# Patient Record
Sex: FEMALE | Race: OTHER | HISPANIC OR LATINO
[De-identification: names, ages, dates, MRNs, and addresses within clinical notes are randomized per-mention and may not be internally consistent; named-entity substitution may affect disease eponyms.]

---

## 2018-05-11 ENCOUNTER — APPOINTMENT (OUTPATIENT)
Dept: VASCULAR SURGERY | Facility: CLINIC | Age: 68
End: 2018-05-11
Payer: MEDICARE

## 2018-05-11 VITALS — DIASTOLIC BLOOD PRESSURE: 80 MMHG | SYSTOLIC BLOOD PRESSURE: 152 MMHG | HEART RATE: 81 BPM | OXYGEN SATURATION: 99 %

## 2018-05-11 DIAGNOSIS — Z86.79 PERSONAL HISTORY OF OTHER DISEASES OF THE CIRCULATORY SYSTEM: ICD-10-CM

## 2018-05-11 PROCEDURE — 99213 OFFICE O/P EST LOW 20 MIN: CPT | Mod: 25

## 2018-05-11 PROCEDURE — 93990 DOPPLER FLOW TESTING: CPT

## 2018-05-16 PROBLEM — Z86.79 HISTORY OF HYPERTENSION: Status: RESOLVED | Noted: 2018-05-16 | Resolved: 2018-05-16

## 2018-11-30 ENCOUNTER — APPOINTMENT (OUTPATIENT)
Dept: VASCULAR SURGERY | Facility: CLINIC | Age: 68
End: 2018-11-30
Payer: MEDICARE

## 2018-11-30 PROCEDURE — 93990 DOPPLER FLOW TESTING: CPT

## 2018-11-30 PROCEDURE — 99213 OFFICE O/P EST LOW 20 MIN: CPT

## 2019-03-01 ENCOUNTER — APPOINTMENT (OUTPATIENT)
Dept: VASCULAR SURGERY | Facility: CLINIC | Age: 69
End: 2019-03-01

## 2019-04-22 ENCOUNTER — APPOINTMENT (OUTPATIENT)
Dept: VASCULAR SURGERY | Facility: CLINIC | Age: 69
End: 2019-04-22
Payer: MEDICARE

## 2019-04-22 VITALS — SYSTOLIC BLOOD PRESSURE: 105 MMHG | HEART RATE: 99 BPM | OXYGEN SATURATION: 96 % | DIASTOLIC BLOOD PRESSURE: 72 MMHG

## 2019-04-22 PROCEDURE — 99213 OFFICE O/P EST LOW 20 MIN: CPT

## 2019-04-22 PROCEDURE — 93990 DOPPLER FLOW TESTING: CPT

## 2019-04-23 NOTE — HISTORY OF PRESENT ILLNESS
[FreeTextEntry1] : 67 yo F, with ESRD, on HD via  L AVF  presents today for follow up on her fistula. Patient is doing well. She denies any pain at AVF site, no swelling. She does report some bleeding at the venous site during diallysis.

## 2019-04-23 NOTE — REVIEW OF SYSTEMS
[Negative] : Endocrine [Fever] : no fever [Chills] : no chills [Leg Claudication] : no intermittent leg claudication [Lower Ext Edema] : no lower extremity edema [Limb Pain] : no limb pain [Limb Swelling] : no limb swelling

## 2019-04-23 NOTE — END OF VISIT
[FreeTextEntry3] : All medical record entries made by the Scribe were at my, Dr. Locke's direction and personally dictated by me on 04/22/2019 . I have reviewed the chart and agree that the record accurately reflects my personal performance of the history, physical exam, assessment and plan. I have also personally directed, reviewed, and agreed with the chart.

## 2019-04-23 NOTE — PHYSICAL EXAM
[Normal Heart Sounds] : normal heart sounds [Normal Breath Sounds] : Normal breath sounds [2+] : left 2+ [Alert] : alert [Calm] : calm [JVD] : no jugular venous distention  [de-identified] : WN/WD, NAD [FreeTextEntry1] : LUE: well healed incision over medial upper arm, good, strong thrill

## 2019-04-23 NOTE — ASSESSMENT
[FreeTextEntry1] : 67 yo F, with ESRD, on HD via  L AVF  presents today for follow up on her fistula. US done in the office today demonstrating good flow. F/u in 3 months or sooner if necessary.

## 2019-07-22 ENCOUNTER — APPOINTMENT (OUTPATIENT)
Dept: VASCULAR SURGERY | Facility: CLINIC | Age: 69
End: 2019-07-22
Payer: MEDICARE

## 2019-07-22 PROCEDURE — 93990 DOPPLER FLOW TESTING: CPT

## 2019-07-22 PROCEDURE — 99213 OFFICE O/P EST LOW 20 MIN: CPT

## 2019-07-22 NOTE — REVIEW OF SYSTEMS
[Negative] : Heme/Lymph [Fever] : no fever [Leg Claudication] : no intermittent leg claudication [Chills] : no chills [Lower Ext Edema] : no lower extremity edema [Limb Pain] : no limb pain [Limb Swelling] : no limb swelling

## 2019-07-22 NOTE — END OF VISIT
[FreeTextEntry3] : All medical record entries made by the Scribe were at my, Dr. Locke's, discretion and personally dictated by me on 07/22/2019 . I have reviewed the chart and agree that the record accurately reflects my personal performance of the history, physical exam, assessment and plan. I have also personally directed, reviewed and agreed to the chart.

## 2019-07-22 NOTE — PHYSICAL EXAM
[Normal Breath Sounds] : Normal breath sounds [Normal Heart Sounds] : normal heart sounds [2+] : left 2+ [Alert] : alert [Calm] : calm [JVD] : no jugular venous distention  [de-identified] : WN/WD, NAD [de-identified] : NCAT [FreeTextEntry1] : LUE: well healed incision over medial upper arm, good, strong thrill

## 2019-07-22 NOTE — HISTORY OF PRESENT ILLNESS
[FreeTextEntry1] : 70 yo F with ESRD, on HD via  L AVF  presents today for follow up on her fistula. Patient is doing well. She denies any pain at AVF site, no swelling.

## 2019-07-22 NOTE — ADDENDUM
[FreeTextEntry1] : This note was written by Nellie Gamez on 07/22/2019  acting as scribe for Dr. Salcedo.\par

## 2019-07-22 NOTE — ASSESSMENT
[FreeTextEntry1] : 68 yo F with ESRD, on HD via L AVF  presents today for follow up on her fistula. US done in the office today demonstrating good flow. F/u in 3 months or sooner if necessary.

## 2019-11-11 ENCOUNTER — APPOINTMENT (OUTPATIENT)
Dept: VASCULAR SURGERY | Facility: CLINIC | Age: 69
End: 2019-11-11

## 2020-01-31 ENCOUNTER — APPOINTMENT (OUTPATIENT)
Dept: VASCULAR SURGERY | Facility: CLINIC | Age: 70
End: 2020-01-31
Payer: MEDICARE

## 2020-01-31 PROCEDURE — 93990 DOPPLER FLOW TESTING: CPT

## 2020-01-31 PROCEDURE — 99213 OFFICE O/P EST LOW 20 MIN: CPT

## 2020-01-31 NOTE — HISTORY OF PRESENT ILLNESS
[FreeTextEntry1] : 69 y/o F with ESRD, on HD via L AVF presents today for follow up on her fistula. Patient is doing well. She denies any pain at AVF site, no swelling. \par F/u in 3 months

## 2020-04-24 ENCOUNTER — APPOINTMENT (OUTPATIENT)
Dept: VASCULAR SURGERY | Facility: CLINIC | Age: 70
End: 2020-04-24

## 2020-07-24 ENCOUNTER — APPOINTMENT (OUTPATIENT)
Dept: VASCULAR SURGERY | Facility: CLINIC | Age: 70
End: 2020-07-24
Payer: MEDICARE

## 2020-07-24 DIAGNOSIS — N18.6 END STAGE RENAL DISEASE: ICD-10-CM

## 2020-07-24 DIAGNOSIS — Z99.2 END STAGE RENAL DISEASE: ICD-10-CM

## 2020-07-24 DIAGNOSIS — T82.590A OTHER MECHANICAL COMPLICATION OF SURGICALLY CREATED ARTERIOVENOUS FISTULA, INITIAL ENCOUNTER: ICD-10-CM

## 2020-07-24 PROCEDURE — 99214 OFFICE O/P EST MOD 30 MIN: CPT

## 2020-07-24 PROCEDURE — 93990 DOPPLER FLOW TESTING: CPT

## 2020-07-24 NOTE — HISTORY OF PRESENT ILLNESS
[FreeTextEntry1] : 69 y/o F with ESRD, on HD via L AVF presents today for follow up on her fistula. Patient states that she developed  prolonged bleeding after her dialysis session and she experiences occasional pains. She is able to complete her dialysis sessions. No other c/os at this time.\par

## 2020-07-24 NOTE — PHYSICAL EXAM
[2+] : left 2+ [No Rash or Lesion] : No rash or lesion [Calm] : calm [de-identified] : pleasant [de-identified] : +FROM throughout [FreeTextEntry1] : LUE: + thrill appreciated, pulses intact [de-identified] : grossly intact

## 2020-07-24 NOTE — ASSESSMENT
[Arterial/Venous Disease] : arterial/venous disease [FreeTextEntry1] : 71 y/o F with ESRD, on HD via L AVF presents today for follow up on her fistula. 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

## 2020-07-24 NOTE — PROCEDURE
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

## 2020-07-27 ENCOUNTER — LABORATORY RESULT (OUTPATIENT)
Age: 70
End: 2020-07-27

## 2020-07-29 ENCOUNTER — TRANSCRIPTION ENCOUNTER (OUTPATIENT)
Age: 70
End: 2020-07-29

## 2020-07-29 VITALS
RESPIRATION RATE: 18 BRPM | DIASTOLIC BLOOD PRESSURE: 78 MMHG | SYSTOLIC BLOOD PRESSURE: 113 MMHG | OXYGEN SATURATION: 100 % | WEIGHT: 118.83 LBS | TEMPERATURE: 98 F | HEART RATE: 67 BPM | HEIGHT: 63 IN

## 2020-07-29 NOTE — ASU PATIENT PROFILE, ADULT - NS PRO AD PATIENT TYPE
Health Care Proxy (HCP)/Hernan Asher-friend Health Care Proxy (HCP)/Hernan Mulligan-[facility]  [phone]

## 2020-07-29 NOTE — ASU PATIENT PROFILE, ADULT - PMH
Gastritis    Hyperlipidemia    Hypothyroidism    Polycystic kidney disease ESRD (end stage renal disease)  HD M-W-F  Gastritis    Hyperlipidemia    Hypothyroidism    Polycystic kidney disease

## 2020-07-30 ENCOUNTER — OUTPATIENT (OUTPATIENT)
Dept: INPATIENT UNIT | Facility: HOSPITAL | Age: 70
LOS: 1 days | Discharge: HOME | End: 2020-07-30
Payer: MEDICARE

## 2020-07-30 ENCOUNTER — APPOINTMENT (OUTPATIENT)
Dept: VASCULAR SURGERY | Facility: HOSPITAL | Age: 70
End: 2020-07-30

## 2020-07-30 VITALS
HEART RATE: 73 BPM | OXYGEN SATURATION: 100 % | RESPIRATION RATE: 31 BRPM | SYSTOLIC BLOOD PRESSURE: 91 MMHG | DIASTOLIC BLOOD PRESSURE: 64 MMHG

## 2020-07-30 DIAGNOSIS — Z90.5 ACQUIRED ABSENCE OF KIDNEY: Chronic | ICD-10-CM

## 2020-07-30 DIAGNOSIS — Z99.2 DEPENDENCE ON RENAL DIALYSIS: Chronic | ICD-10-CM

## 2020-07-30 PROCEDURE — C1894: CPT

## 2020-07-30 PROCEDURE — C1769: CPT

## 2020-07-30 PROCEDURE — 36903 INTRO CATH DIALYSIS CIRCUIT: CPT

## 2020-07-30 PROCEDURE — C1887: CPT

## 2020-07-30 PROCEDURE — 76000 FLUOROSCOPY <1 HR PHYS/QHP: CPT

## 2020-07-30 PROCEDURE — 36902 INTRO CATH DIALYSIS CIRCUIT: CPT | Mod: GC

## 2020-07-30 PROCEDURE — C1725: CPT

## 2020-07-30 RX ORDER — ACETAMINOPHEN 500 MG
650 TABLET ORAL EVERY 4 HOURS
Refills: 0 | Status: DISCONTINUED | OUTPATIENT
Start: 2020-07-30 | End: 2020-07-30

## 2020-07-30 NOTE — BRIEF OPERATIVE NOTE - OPERATION/FINDINGS
Local anesthesia with sedation. Access obtain in LUE fistula with 5F sheath. Balloon angioplasty with mustang 10x40 of the L subclavian vein proximal to the previous stents- Previous stents in place and patent. Upsized to 7F sheath and uses cutting balloon 10x40 for angioplasty of the distal subclavian vein just before the previous stents. &F sheath left in place. Pt in the PACU. Will obtain ACT, remove the sheath safely and pt can be discharged home. Local anesthesia with sedation. Access obtain in LUE fistula with 5F sheath and 4000U of heparin administered. 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 Balloon angioplasty with mustang 10x40 of the L subclavian vein proximal to the previous stents- Previous stents in place and patent. Upsized to 7F sheath and use of a cutting balloon 10x40 for angioplasty of the distal subclavian vein just before the previous stents. &F sheath left in place. Pt in the PACU. Will obtain ACT, remove the sheath safely and pt can be discharged home. Local anesthesia with sedation. Access obtain in LUE fistula with 5F sheath and 4000U of heparin administered. 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 Balloon angioplasty with mustang 10x40 of the L subclavian vein proximal to the previous stents- Previous stents in place and patent. Upsized to 7F sheath and use of a cutting balloon 10x40 for angioplasty of the distal subclavian vein just before the previous stents. 7F sheath left in place. Palpable thrill after the procedure. Pt in the PACU. Will obtain ACT, remove the sheath safely and pt can be discharged home.

## 2020-07-30 NOTE — PACU DISCHARGE NOTE - COMMENTS
Patient is being picked up by family who are waiting downstairs in the lobby. Patient is alert and awake. she ate food has stable gait. She understands her discharge instructions and her IV was removed.

## 2020-08-01 PROBLEM — N18.6 END STAGE RENAL DISEASE: Chronic | Status: ACTIVE | Noted: 2020-07-29

## 2020-08-14 ENCOUNTER — APPOINTMENT (OUTPATIENT)
Dept: VASCULAR SURGERY | Facility: CLINIC | Age: 70
End: 2020-08-14
Payer: MEDICARE

## 2020-08-14 PROCEDURE — 99213 OFFICE O/P EST LOW 20 MIN: CPT

## 2020-08-17 NOTE — DISCUSSION/SUMMARY
[FreeTextEntry1] : Patient is a 70 year old Female with hx ESRD on HD via L AVF presents for POP f/u s/p Left AV fistulogram and angioplasty of the distal subclavian vein. Patient is doing well, feeling well overall. She denies any new bleeding after dialysis. Patient will f/u in 3 months.

## 2020-08-17 NOTE — PHYSICAL EXAM
[Normal Breath Sounds] : Normal breath sounds [2+] : left 2+ [No Rash or Lesion] : No rash or lesion [Calm] : calm [de-identified] : Well appearing, friendly cooperative  [de-identified] : Supple

## 2020-08-17 NOTE — ADDENDUM
[FreeTextEntry1] : This note was written by Hiral Brennan on 08/14/2020 acting as scribe for Liz Ulrich M.D.\par \par I, Liz Ulrich M.D., have read and attest that all the information, medical decision making and discharge instructions within are true and accurate.\par

## 2020-08-17 NOTE — REASON FOR VISIT
[de-identified] : Left AV fistulogram and angioplasty of the distal subclavian vein.  [de-identified] : 7/30 [de-identified] : Patient is a 70 year old Female with hx ESRD on HD via L AVF presents for POP f/u s/p Left AV fistulogram and angioplasty of the distal subclavian vein.  Today POP f/u patient is doing well, feeling well overall.

## 2020-11-13 ENCOUNTER — APPOINTMENT (OUTPATIENT)
Dept: VASCULAR SURGERY | Facility: CLINIC | Age: 70
End: 2020-11-13
Payer: MEDICARE

## 2020-11-13 DIAGNOSIS — Z01.818 ENCOUNTER FOR OTHER PREPROCEDURAL EXAMINATION: ICD-10-CM

## 2020-11-13 PROCEDURE — 99213 OFFICE O/P EST LOW 20 MIN: CPT

## 2020-11-13 PROCEDURE — 93990 DOPPLER FLOW TESTING: CPT

## 2020-11-13 NOTE — PROCEDURE
[FreeTextEntry1] : LUE venous doppler done:  AVF patent with flow restrictive lesion noted in the outflow of the subclavian vein with stenosis.

## 2020-11-13 NOTE — ASSESSMENT
[FreeTextEntry1] : 71 y/o F hx ESRD on HD (M/W/F) via L AVF s/p Left AV fistulogram and angioplasty of the distal subclavian vein presents for follow up evaluation at 3 months. LUE venous doppler done:  AVF patent with flow restrictive lesion noted in the outflow of the subclavian vein with stenosis. Discussed with pt L AVF fistulogram warranted at this time. Given findings on LUE venous doppler. The risks, benefits, convalescence and alternatives were reviewed. Numerous questions were asked and answered. Procedure to be scheduled for 11/19/2020. In the interim, we will seek authorization from her insurance. Patient to contact the office in case she has any further questions.

## 2020-11-13 NOTE — HISTORY OF PRESENT ILLNESS
[FreeTextEntry1] : 71 y/o F hx ESRD on HD via L AVF  s/p Left AV fistulogram and angioplasty of the distal subclavian vein presents for follow up evaluation at 3 months. Today she reports feeling well, she does report having intermittent bleeding during her dialysis through the L AVF. \par Denies: Fever, chills, N/V. \par \par

## 2020-11-13 NOTE — PHYSICAL EXAM
[Respiratory Effort] : normal respiratory effort [Normal Rate and Rhythm] : normal rate and rhythm [Alert] : alert [Oriented to Person] : oriented to person [Oriented to Place] : oriented to place [Oriented to Time] : oriented to time [Calm] : calm [2+] : left 2+ [Abdomen Tenderness] : ~T ~M No abdominal tenderness [de-identified] : Well appearing in no apparent acute distress  [de-identified] : NC/AT  [de-identified] : Supple  [de-identified] : FROM

## 2020-11-13 NOTE — ADDENDUM
[FreeTextEntry1] : This note was written by Hiral Brennan on 11/13/2020 acting as scribe for Liz Hayes M.D.\par \par I, Liz Ulrich have read and attest that all the information, medical decision making and discharge instructions within are true and accurate.